# Patient Record
Sex: FEMALE | Race: WHITE
[De-identification: names, ages, dates, MRNs, and addresses within clinical notes are randomized per-mention and may not be internally consistent; named-entity substitution may affect disease eponyms.]

---

## 2018-11-30 ENCOUNTER — HOSPITAL ENCOUNTER (EMERGENCY)
Dept: HOSPITAL 25 - ED | Age: 18
Discharge: HOME | End: 2018-11-30
Payer: COMMERCIAL

## 2018-11-30 VITALS — SYSTOLIC BLOOD PRESSURE: 100 MMHG | DIASTOLIC BLOOD PRESSURE: 60 MMHG

## 2018-11-30 DIAGNOSIS — F10.120: Primary | ICD-10-CM

## 2018-11-30 LAB
BASOPHILS # BLD AUTO: 0 10^3/UL (ref 0–0.2)
EOSINOPHIL # BLD AUTO: 0 10^3/UL (ref 0–0.6)
HCT VFR BLD AUTO: 44 % (ref 35–47)
HGB BLD-MCNC: 14.8 G/DL (ref 12–16)
LYMPHOCYTES # BLD AUTO: 2.7 10^3/UL (ref 1–4.8)
MCH RBC QN AUTO: 31 PG (ref 27–31)
MCHC RBC AUTO-ENTMCNC: 34 G/DL (ref 31–36)
MCV RBC AUTO: 92 FL (ref 80–97)
MONOCYTES # BLD AUTO: 0.4 10^3/UL (ref 0–0.8)
NEUTROPHILS # BLD AUTO: 4.3 10^3/UL (ref 1.5–7.7)
NRBC # BLD AUTO: 0 10^3/UL
NRBC BLD QL AUTO: 0.1
PLATELET # BLD AUTO: 356 10^3/UL (ref 150–450)
RBC # BLD AUTO: 4.76 10^6/UL (ref 4–5.4)
WBC # BLD AUTO: 7.4 10^3/UL (ref 3.5–10.8)

## 2018-11-30 PROCEDURE — 36415 COLL VENOUS BLD VENIPUNCTURE: CPT

## 2018-11-30 PROCEDURE — G0480 DRUG TEST DEF 1-7 CLASSES: HCPCS

## 2018-11-30 PROCEDURE — 80053 COMPREHEN METABOLIC PANEL: CPT

## 2018-11-30 PROCEDURE — 80320 DRUG SCREEN QUANTALCOHOLS: CPT

## 2018-11-30 PROCEDURE — 99285 EMERGENCY DEPT VISIT HI MDM: CPT

## 2018-11-30 PROCEDURE — 85025 COMPLETE CBC W/AUTO DIFF WBC: CPT

## 2018-11-30 NOTE — ED
Progress





- Progress Note


Progress Note: 





Patient was signed out from Dr. Llanos to Dr. Nuno, pending sobriety, during 

shift change at 07:00 11/30/18.


Upon re-eval patient is awake, alert and oriented. She will be discharged when 

her friend arrives to drive her home.





Re-Evaluation





- Re-Evaluation


  ** First Eval


Re-Evaluation Time: 10:45


Change: Improved


Comment: Patient is awake, alert and oriented.





Course/Dx





- Course


Course Of Treatment: Patient was signed out from Dr. Llanos to Dr. Nuno, 

pending sobriety, during shift change at 07:00 11/30/18.  Upon re-eval patient 

is awake, alert and oriented. The patient will be discharged. I discussed 

results with patient and she reports feeling better. She is hemodynamically 

stable and safe for discharge. Strict return precautions given and she will 

otherwise follow up with her PCP.





- Diagnoses


Provider Diagnoses: 


 Alcohol intoxication








Discharge





- Sign-Out/Discharge


Documenting (check all that apply): Patient Departure - DC





- Discharge Plan


Condition: Stable


Disposition: HOME


Patient Education Materials:  Alcohol Intoxication (ED)


Referrals: 


[facility] [place] PHYSICIAN REFERRAL [Outside] (1-3 days)


Additional Instructions: 


Follow up with your primary care physician in 1-3 days.


RETURN TO THE EMERGENCY DEPARTMENT FOR CHANGING OR WORSENING SYMPTOMS.





- Billing Disposition and Condition


Condition: STABLE


Disposition: Home





- Attestation Statements


Document Initiated by Lisa: Yes


Documenting Scribe: Matthew Galicia


Provider For Whom Lisa is Documenting (Include Credential): Dominguez Nuno MD


Scribe Attestation: 


I, Matthew Galicia scribed for Dominguez Nuno MD on 12/02/18 at 0247. 


Scribe Documentation Reviewed: Yes


Provider Attestation: 


The documentation as recorded by the Matthew blake accurately 

reflects the service I personally performed and the decisions made by me, Donna Nuno MD


Status of Scribe Document: Viewed

## 2018-11-30 NOTE — ED
Substance Abuse/Use





- HPI Summary


HPI Summary: 


A 19 y/o female brought in by ambulance presents to ED severely intoxicated. As 

per triage, "Pt arrived BLS by [place] amb ETOH, Pt was found by friends highly 

ETOH and vomiting". 





LEVEL 5 CAVEAT.





- History Of Current Complaint


Chief Complaint: EDSubstanceAbuse


Stated Complaint: ETOH


Time Seen by Provider: 11/30/18 02:21


Hx Obtained From: EMS


Hx From Patient Unobtainable Due To: Other - ALCOHOL INTOXICATION.





- Allergies/Home Medications


Allergies/Adverse Reactions: 


 Allergies











Allergy/AdvReac Type Severity Reaction Status Date / Time


 


Unable to Assess Allergy   Verified 11/30/18 02:44











Home Medications: 


 Home Medications





Unobtainable  11/30/18 [History Confirmed 11/30/18]











PMH/Surg Hx/FS Hx/Imm Hx


Infectious Disease History: No


Infectious Disease History: 


   Denies: Traveled Outside the [place] in Last 30 Days





Review of Systems


All Other Systems Reviewed And Are Negative: No





Physical Exam





- Summary


Physical Exam Summary: 


Appearance: Well appearing, no pain distress


Skin: warm, dry, reflects adequate perfusion


Head/face: normal


Eyes: EOMI, AMARI


ENT: normal


Neck: supple, non-tender


Respiratory: CTA, breath sounds present


Cardiovascular: RRR, pulses symmetrical 


Abdomen: non-tender, soft


Musculoskeletal: normal, strength/ROM intact


Neuro: sensory motor intact, alert and confused





Triage Information Reviewed: Yes


Vital Signs On Initial Exam: 


 Initial Vitals











Pulse BP Pulse Ox


 


 120   121/77   98 


 


 11/30/18 02:17  11/30/18 02:17  11/30/18 02:17











Vital Signs Reviewed: Yes





Diagnostics





- Vital Signs


 Vital Signs











  Temp Pulse Resp BP Pulse Ox


 


 11/30/18 02:36  97 F  110  16  121/77  98


 


 11/30/18 02:32   114    97


 


 11/30/18 02:17   120   121/77  98














- Laboratory


Result Diagrams: 


 11/30/18 02:32





 11/30/18 02:32


Lab Statement: Any lab studies that have been ordered have been reviewed, and 

results considered in the medical decision making process.





Re-Evaluation





- Re-Evaluation


  ** First Eval


Re-Evaluation Time: 10:45


Change: Improved


Comment: Patient is awake, alert and oriented.





Course/Dx





- Course


Course Of Treatment: A 19 y/o female brought in by ambulance presents to ED 

severely intoxicated. As per triage, "Pt arrived BLS by [place] amb ETOH, Pt was 

found by friends highly ETOH and vomiting".  LEVEL 5 CAVEAT.  PATIENT WAS 

SIGNED OUT TO DR. NUNO VIA DR. WEEMS, PENDING EVALUATION AND DISPOSITION, 

DURING SHIFT CHANGE ON 11/30/2018 AT 0700.





- Diagnoses


Differential Diagnosis/HQI/PQRI: Positive: Alcohol Abuse


Provider Diagnoses: 


 Alcohol intoxication








Discharge





- Sign-Out/Discharge


Documenting (check all that apply): Sign-Out Patient - ANGELA


Signing out patient TO: Dominguez Nuno


Receiving patient FROM: Arturo Weems





- Discharge Plan


Condition: Stable


Disposition: HOME


Patient Education Materials:  Alcohol Intoxication (ED)


[place] 


[facility] [place] (1-3 days)


Additional Instructions: 


Follow up with your primary care physician in 1-3 days.


RETURN TO THE EMERGENCY DEPARTMENT FOR CHANGING OR WORSENING SYMPTOMS.





- Billing Disposition and Condition


Condition: STABLE


Disposition: Home





- Attestation Statements


Document Initiated by Scribe: Yes


Documenting Scribe: Boy Amaya


Provider For Whom Lisa is Documenting (Include Credential): Arturo Weems MD


Scribe Attestation: 


Boy OLEARY scribed for Arturo Weems MD on 11/30/18 at 2129. 


Scribe Documentation Reviewed: Yes


Provider Attestation: 


The documentation as recorded by the Boy blake accurately reflects 

the service I personally performed and the decisions made by Arturo alvarez MD


Status of Scribe Document: Viewed